# Patient Record
Sex: FEMALE | Race: WHITE
[De-identification: names, ages, dates, MRNs, and addresses within clinical notes are randomized per-mention and may not be internally consistent; named-entity substitution may affect disease eponyms.]

---

## 2020-05-01 ENCOUNTER — HOSPITAL ENCOUNTER (OUTPATIENT)
Dept: HOSPITAL 100 - ED | Age: 55
Setting detail: OBSERVATION
LOS: 1 days | Discharge: HOME | End: 2020-05-02
Payer: COMMERCIAL

## 2020-05-01 VITALS
DIASTOLIC BLOOD PRESSURE: 81 MMHG | HEART RATE: 78 BPM | RESPIRATION RATE: 18 BRPM | OXYGEN SATURATION: 94 % | TEMPERATURE: 97.88 F | SYSTOLIC BLOOD PRESSURE: 143 MMHG

## 2020-05-01 VITALS
RESPIRATION RATE: 18 BRPM | DIASTOLIC BLOOD PRESSURE: 84 MMHG | SYSTOLIC BLOOD PRESSURE: 128 MMHG | TEMPERATURE: 97.6 F | HEART RATE: 79 BPM | OXYGEN SATURATION: 99 %

## 2020-05-01 VITALS — RESPIRATION RATE: 14 BRPM | HEART RATE: 87 BPM | TEMPERATURE: 97.88 F | OXYGEN SATURATION: 99 %

## 2020-05-01 VITALS
DIASTOLIC BLOOD PRESSURE: 80 MMHG | RESPIRATION RATE: 18 BRPM | HEART RATE: 85 BPM | OXYGEN SATURATION: 98 % | SYSTOLIC BLOOD PRESSURE: 125 MMHG

## 2020-05-01 VITALS — HEART RATE: 79 BPM | DIASTOLIC BLOOD PRESSURE: 84 MMHG | SYSTOLIC BLOOD PRESSURE: 128 MMHG

## 2020-05-01 VITALS — HEART RATE: 85 BPM

## 2020-05-01 VITALS — BODY MASS INDEX: 22.4 KG/M2 | BODY MASS INDEX: 23.8 KG/M2

## 2020-05-01 VITALS
TEMPERATURE: 98.06 F | HEART RATE: 79 BPM | OXYGEN SATURATION: 99 % | RESPIRATION RATE: 17 BRPM | SYSTOLIC BLOOD PRESSURE: 126 MMHG | DIASTOLIC BLOOD PRESSURE: 81 MMHG

## 2020-05-01 VITALS — HEART RATE: 76 BPM

## 2020-05-01 DIAGNOSIS — R07.89: Primary | ICD-10-CM

## 2020-05-01 DIAGNOSIS — Z87.891: ICD-10-CM

## 2020-05-01 DIAGNOSIS — M06.9: ICD-10-CM

## 2020-05-01 DIAGNOSIS — G89.29: ICD-10-CM

## 2020-05-01 DIAGNOSIS — Z79.899: ICD-10-CM

## 2020-05-01 DIAGNOSIS — G43.909: ICD-10-CM

## 2020-05-01 DIAGNOSIS — K21.9: ICD-10-CM

## 2020-05-01 DIAGNOSIS — M81.0: ICD-10-CM

## 2020-05-01 LAB
ANION GAP: 4 (ref 5–15)
ANISOCYTOSIS BLD QL SMEAR: (no result)
ANISOCYTOSIS: (no result)
BUN SERPL-MCNC: 24 MG/DL (ref 7–18)
BUN/CREAT RATIO: 22.2 RATIO (ref 10–20)
CALCIUM SERPL-MCNC: 9.2 MG/DL (ref 8.5–10.1)
CARBON DIOXIDE: 28 MMOL/L (ref 21–32)
CHLORIDE: 107 MMOL/L (ref 98–107)
DEPRECATED RDW RBC: 46.3 FL (ref 35.1–43.9)
DIFFERENTIAL INDICATED: (no result)
ERYTHROCYTE [DISTWIDTH] IN BLOOD: 12.8 % (ref 11.6–14.6)
EST GLOM FILT RATE - AFR AMER: 67 ML/MIN (ref 60–?)
ESTIMATED CREATININE CLEARANCE: 48.11 ML/MIN
GLUCOSE: 90 MG/DL (ref 74–106)
HCT VFR BLD AUTO: 34.7 % (ref 37–47)
HEMOGLOBIN: 11.2 G/DL (ref 12–15)
HGB BLD-MCNC: 11.2 G/DL (ref 12–15)
IMMATURE GRANULOCYTES COUNT: 0.03 X10^3/UL (ref 0–0)
MACROCYTES BLD QL: (no result)
MACROCYTOSIS: (no result)
MAGNESIUM: 2.3 MG/DL (ref 1.6–2.6)
MCV RBC: 97.7 FL (ref 81–99)
MEAN CORP HGB CONC: 32.3 G/DL (ref 32–36)
MEAN PLATELET VOL.: 10.2 FL (ref 6.2–12)
NRBC FLAGGED BY ANALYZER: 0 % (ref 0–5)
PLATELET # BLD: 303 K/MM3 (ref 150–450)
PLATELET COUNT: 303 K/MM3 (ref 150–450)
POSITIVE MORPHOLOGY: YES
POTASSIUM: 3.8 MMOL/L (ref 3.5–5.1)
RBC # BLD AUTO: 3.55 M/MM3 (ref 4.2–5.4)
RBC DISTRIBUTION WIDTH CV: 12.8 % (ref 11.6–14.6)
RBC DISTRIBUTION WIDTH SD: 46.3 FL (ref 35.1–43.9)
RBC MORPH BLD: (no result) NORMAL
RED CELL MORPHOLOGY: (no result) NORMAL
SCAN SMEAR PER REVIEW CRITERIA: (no result)
WBC # BLD AUTO: 8.1 K/MM3 (ref 4.4–11)
WHITE BLOOD COUNT: 8.1 K/MM3 (ref 4.4–11)

## 2020-05-01 PROCEDURE — 78452 HT MUSCLE IMAGE SPECT MULT: CPT

## 2020-05-01 PROCEDURE — 99218: CPT

## 2020-05-01 PROCEDURE — 80048 BASIC METABOLIC PNL TOTAL CA: CPT

## 2020-05-01 PROCEDURE — G0378 HOSPITAL OBSERVATION PER HR: HCPCS

## 2020-05-01 PROCEDURE — 93005 ELECTROCARDIOGRAM TRACING: CPT

## 2020-05-01 PROCEDURE — 84484 ASSAY OF TROPONIN QUANT: CPT

## 2020-05-01 PROCEDURE — A4216 STERILE WATER/SALINE, 10 ML: HCPCS

## 2020-05-01 PROCEDURE — 80061 LIPID PANEL: CPT

## 2020-05-01 PROCEDURE — 83735 ASSAY OF MAGNESIUM: CPT

## 2020-05-01 PROCEDURE — 93017 CV STRESS TEST TRACING ONLY: CPT

## 2020-05-01 PROCEDURE — 71046 X-RAY EXAM CHEST 2 VIEWS: CPT

## 2020-05-01 PROCEDURE — 36415 COLL VENOUS BLD VENIPUNCTURE: CPT

## 2020-05-01 PROCEDURE — 85025 COMPLETE CBC W/AUTO DIFF WBC: CPT

## 2020-05-01 PROCEDURE — A9500 TC99M SESTAMIBI: HCPCS

## 2020-05-01 PROCEDURE — 99285 EMERGENCY DEPT VISIT HI MDM: CPT

## 2020-05-01 RX ADMIN — NITROGLYCERIN 0.4 MG: 0.4 TABLET, ORALLY DISINTEGRATING SUBLINGUAL at 21:47

## 2020-05-02 VITALS
TEMPERATURE: 98.1 F | OXYGEN SATURATION: 99 % | SYSTOLIC BLOOD PRESSURE: 102 MMHG | RESPIRATION RATE: 16 BRPM | DIASTOLIC BLOOD PRESSURE: 69 MMHG | HEART RATE: 83 BPM

## 2020-05-02 VITALS
HEART RATE: 81 BPM | RESPIRATION RATE: 18 BRPM | TEMPERATURE: 97.34 F | SYSTOLIC BLOOD PRESSURE: 114 MMHG | DIASTOLIC BLOOD PRESSURE: 69 MMHG | OXYGEN SATURATION: 97 %

## 2020-05-02 VITALS
DIASTOLIC BLOOD PRESSURE: 74 MMHG | TEMPERATURE: 98 F | SYSTOLIC BLOOD PRESSURE: 116 MMHG | RESPIRATION RATE: 18 BRPM | HEART RATE: 78 BPM | OXYGEN SATURATION: 99 %

## 2020-05-02 VITALS — HEART RATE: 83 BPM

## 2020-05-02 VITALS — HEART RATE: 84 BPM

## 2020-05-02 VITALS — HEART RATE: 81 BPM

## 2020-05-02 LAB
ANION GAP: 7 (ref 5–15)
BUN SERPL-MCNC: 19 MG/DL (ref 7–18)
BUN/CREAT RATIO: 21.6 RATIO (ref 10–20)
CALCIUM SERPL-MCNC: 8.6 MG/DL (ref 8.5–10.1)
CARBON DIOXIDE: 24 MMOL/L (ref 21–32)
CHLORIDE: 110 MMOL/L (ref 98–107)
CHOLEST SERPL-MCNC: 211 MG/DL
EST GLOM FILT RATE - AFR AMER: 86 ML/MIN (ref 60–?)
ESTIMATED CREATININE CLEARANCE: 56.46 ML/MIN
GLUCOSE: 99 MG/DL (ref 74–106)
POTASSIUM: 3.4 MMOL/L (ref 3.5–5.1)
TRIGLYCERIDES: 136 MG/DL
VLDLC SERPL-MCNC: 27 MG/DL (ref 5–40)

## 2020-05-02 RX ADMIN — ASPIRIN 81 MG: 81 TABLET, COATED ORAL at 08:42

## 2020-05-02 RX ADMIN — BUPROPION HYDROCHLORIDE 150 MG: 150 TABLET, EXTENDED RELEASE ORAL at 11:05

## 2023-04-17 DIAGNOSIS — M85.852 OTHER SPECIFIED DISORDERS OF BONE DENSITY AND STRUCTURE, LEFT THIGH: ICD-10-CM

## 2023-04-17 DIAGNOSIS — M85.851 OTHER SPECIFIED DISORDERS OF BONE DENSITY AND STRUCTURE, RIGHT THIGH: ICD-10-CM

## 2023-04-17 RX ORDER — RALOXIFENE HYDROCHLORIDE 60 MG/1
TABLET, FILM COATED ORAL
Qty: 90 TABLET | Refills: 1 | Status: SHIPPED | OUTPATIENT
Start: 2023-04-17

## 2023-06-11 DIAGNOSIS — G43.001 MIGRAINE WITHOUT AURA AND WITH STATUS MIGRAINOSUS, NOT INTRACTABLE: Primary | ICD-10-CM

## 2023-06-12 RX ORDER — SUMATRIPTAN SUCCINATE 100 MG/1
TABLET ORAL
Qty: 27 TABLET | Refills: 3 | Status: SHIPPED | OUTPATIENT
Start: 2023-06-12 | End: 2023-09-10

## 2024-06-21 ENCOUNTER — HOSPITAL ENCOUNTER (OUTPATIENT)
Dept: RADIOLOGY | Facility: CLINIC | Age: 59
Discharge: HOME | End: 2024-06-21
Payer: COMMERCIAL

## 2024-06-21 ENCOUNTER — LAB REQUISITION (OUTPATIENT)
Dept: LAB | Facility: HOSPITAL | Age: 59
End: 2024-06-21
Payer: COMMERCIAL

## 2024-06-21 DIAGNOSIS — R07.81 RIB PAIN ON LEFT SIDE: ICD-10-CM

## 2024-06-21 DIAGNOSIS — S69.92XA INJURY OF LEFT WRIST, INITIAL ENCOUNTER: ICD-10-CM

## 2024-06-21 DIAGNOSIS — N39.0 URINARY TRACT INFECTION, SITE NOT SPECIFIED: ICD-10-CM

## 2024-06-21 PROCEDURE — 73110 X-RAY EXAM OF WRIST: CPT | Mod: LT

## 2024-06-21 PROCEDURE — 87086 URINE CULTURE/COLONY COUNT: CPT

## 2024-06-21 PROCEDURE — 71101 X-RAY EXAM UNILAT RIBS/CHEST: CPT | Mod: LT

## 2024-06-23 LAB — BACTERIA UR CULT: ABNORMAL

## 2025-03-31 ENCOUNTER — OFFICE (OUTPATIENT)
Dept: URBAN - METROPOLITAN AREA CLINIC 26 | Facility: CLINIC | Age: 60
End: 2025-03-31

## 2025-03-31 VITALS
SYSTOLIC BLOOD PRESSURE: 135 MMHG | HEIGHT: 63 IN | DIASTOLIC BLOOD PRESSURE: 85 MMHG | WEIGHT: 143 LBS | HEART RATE: 78 BPM | TEMPERATURE: 98.2 F

## 2025-03-31 DIAGNOSIS — K59.00 CONSTIPATION, UNSPECIFIED: ICD-10-CM

## 2025-03-31 DIAGNOSIS — K21.9 GASTRO-ESOPHAGEAL REFLUX DISEASE WITHOUT ESOPHAGITIS: ICD-10-CM

## 2025-03-31 DIAGNOSIS — R07.9 CHEST PAIN, UNSPECIFIED: ICD-10-CM

## 2025-03-31 DIAGNOSIS — R07.0 PAIN IN THROAT: ICD-10-CM

## 2025-03-31 PROCEDURE — 99204 OFFICE O/P NEW MOD 45 MIN: CPT | Performed by: INTERNAL MEDICINE

## 2025-04-24 ENCOUNTER — AMBULATORY SURGICAL CENTER (OUTPATIENT)
Dept: URBAN - METROPOLITAN AREA SURGERY 12 | Facility: SURGERY | Age: 60
End: 2025-04-24
Payer: COMMERCIAL

## 2025-04-24 ENCOUNTER — OFFICE (OUTPATIENT)
Dept: URBAN - METROPOLITAN AREA PATHOLOGY 2 | Facility: PATHOLOGY | Age: 60
End: 2025-04-24
Payer: COMMERCIAL

## 2025-04-24 VITALS
RESPIRATION RATE: 15 BRPM | DIASTOLIC BLOOD PRESSURE: 67 MMHG | DIASTOLIC BLOOD PRESSURE: 72 MMHG | HEART RATE: 82 BPM | DIASTOLIC BLOOD PRESSURE: 78 MMHG | DIASTOLIC BLOOD PRESSURE: 77 MMHG | DIASTOLIC BLOOD PRESSURE: 72 MMHG | DIASTOLIC BLOOD PRESSURE: 74 MMHG | DIASTOLIC BLOOD PRESSURE: 72 MMHG | RESPIRATION RATE: 16 BRPM | RESPIRATION RATE: 14 BRPM | OXYGEN SATURATION: 99 % | DIASTOLIC BLOOD PRESSURE: 67 MMHG | RESPIRATION RATE: 14 BRPM | HEART RATE: 79 BPM | SYSTOLIC BLOOD PRESSURE: 105 MMHG | DIASTOLIC BLOOD PRESSURE: 66 MMHG | DIASTOLIC BLOOD PRESSURE: 67 MMHG | SYSTOLIC BLOOD PRESSURE: 111 MMHG | HEART RATE: 85 BPM | HEART RATE: 79 BPM | SYSTOLIC BLOOD PRESSURE: 127 MMHG | HEART RATE: 82 BPM | RESPIRATION RATE: 9 BRPM | SYSTOLIC BLOOD PRESSURE: 117 MMHG | HEART RATE: 93 BPM | HEART RATE: 80 BPM | RESPIRATION RATE: 9 BRPM | HEART RATE: 74 BPM | HEART RATE: 85 BPM | SYSTOLIC BLOOD PRESSURE: 111 MMHG | RESPIRATION RATE: 15 BRPM | DIASTOLIC BLOOD PRESSURE: 74 MMHG | SYSTOLIC BLOOD PRESSURE: 136 MMHG | DIASTOLIC BLOOD PRESSURE: 73 MMHG | HEART RATE: 74 BPM | HEART RATE: 74 BPM | WEIGHT: 138 LBS | DIASTOLIC BLOOD PRESSURE: 77 MMHG | DIASTOLIC BLOOD PRESSURE: 73 MMHG | HEIGHT: 63 IN | RESPIRATION RATE: 15 BRPM | DIASTOLIC BLOOD PRESSURE: 73 MMHG | OXYGEN SATURATION: 99 % | DIASTOLIC BLOOD PRESSURE: 74 MMHG | HEART RATE: 93 BPM | RESPIRATION RATE: 9 BRPM | RESPIRATION RATE: 8 BRPM | TEMPERATURE: 97.3 F | HEART RATE: 85 BPM | DIASTOLIC BLOOD PRESSURE: 66 MMHG | OXYGEN SATURATION: 100 % | SYSTOLIC BLOOD PRESSURE: 116 MMHG | WEIGHT: 138 LBS | SYSTOLIC BLOOD PRESSURE: 116 MMHG | WEIGHT: 138 LBS | OXYGEN SATURATION: 100 % | DIASTOLIC BLOOD PRESSURE: 78 MMHG | OXYGEN SATURATION: 99 % | SYSTOLIC BLOOD PRESSURE: 105 MMHG | SYSTOLIC BLOOD PRESSURE: 117 MMHG | HEART RATE: 79 BPM | SYSTOLIC BLOOD PRESSURE: 111 MMHG | HEART RATE: 82 BPM | SYSTOLIC BLOOD PRESSURE: 116 MMHG | HEIGHT: 63 IN | SYSTOLIC BLOOD PRESSURE: 136 MMHG | SYSTOLIC BLOOD PRESSURE: 117 MMHG | HEART RATE: 93 BPM | DIASTOLIC BLOOD PRESSURE: 78 MMHG | SYSTOLIC BLOOD PRESSURE: 105 MMHG | SYSTOLIC BLOOD PRESSURE: 127 MMHG | DIASTOLIC BLOOD PRESSURE: 77 MMHG | RESPIRATION RATE: 16 BRPM | TEMPERATURE: 97.3 F | RESPIRATION RATE: 8 BRPM | SYSTOLIC BLOOD PRESSURE: 127 MMHG | OXYGEN SATURATION: 98 % | OXYGEN SATURATION: 98 % | SYSTOLIC BLOOD PRESSURE: 136 MMHG | HEIGHT: 63 IN | RESPIRATION RATE: 8 BRPM | OXYGEN SATURATION: 100 % | HEART RATE: 80 BPM | RESPIRATION RATE: 14 BRPM | HEART RATE: 80 BPM | DIASTOLIC BLOOD PRESSURE: 66 MMHG | OXYGEN SATURATION: 98 % | TEMPERATURE: 97.3 F | RESPIRATION RATE: 16 BRPM

## 2025-04-24 DIAGNOSIS — K22.2 ESOPHAGEAL OBSTRUCTION: ICD-10-CM

## 2025-04-24 DIAGNOSIS — R07.9 CHEST PAIN, UNSPECIFIED: ICD-10-CM

## 2025-04-24 DIAGNOSIS — K31.89 OTHER DISEASES OF STOMACH AND DUODENUM: ICD-10-CM

## 2025-04-24 DIAGNOSIS — T18.2XXA FOREIGN BODY IN STOMACH, INITIAL ENCOUNTER: ICD-10-CM

## 2025-04-24 DIAGNOSIS — K21.9 GASTRO-ESOPHAGEAL REFLUX DISEASE WITHOUT ESOPHAGITIS: ICD-10-CM

## 2025-04-24 PROCEDURE — 88342 IMHCHEM/IMCYTCHM 1ST ANTB: CPT | Performed by: PATHOLOGY

## 2025-04-24 PROCEDURE — 43239 EGD BIOPSY SINGLE/MULTIPLE: CPT | Performed by: INTERNAL MEDICINE

## 2025-04-24 PROCEDURE — 43450 DILATE ESOPHAGUS 1/MULT PASS: CPT | Performed by: INTERNAL MEDICINE

## 2025-04-24 PROCEDURE — 88305 TISSUE EXAM BY PATHOLOGIST: CPT | Performed by: PATHOLOGY

## 2025-07-10 ENCOUNTER — OFFICE VISIT (OUTPATIENT)
Dept: URGENT CARE | Age: 60
End: 2025-07-10
Payer: COMMERCIAL

## 2025-07-10 VITALS
TEMPERATURE: 98 F | BODY MASS INDEX: 25.27 KG/M2 | DIASTOLIC BLOOD PRESSURE: 85 MMHG | HEIGHT: 63 IN | SYSTOLIC BLOOD PRESSURE: 124 MMHG | RESPIRATION RATE: 16 BRPM | HEART RATE: 73 BPM | OXYGEN SATURATION: 98 % | WEIGHT: 142.6 LBS

## 2025-07-10 DIAGNOSIS — L03.90 CELLULITIS, UNSPECIFIED CELLULITIS SITE: Primary | ICD-10-CM

## 2025-07-10 RX ORDER — BUPROPION HYDROCHLORIDE 150 MG/1
TABLET ORAL
COMMUNITY

## 2025-07-10 RX ORDER — LORAZEPAM 0.5 MG/1
TABLET ORAL
COMMUNITY

## 2025-07-10 RX ORDER — METOPROLOL SUCCINATE 50 MG/1
50 TABLET, EXTENDED RELEASE ORAL DAILY
COMMUNITY

## 2025-07-10 RX ORDER — LIDOCAINE 560 MG/1
PATCH PERCUTANEOUS; TOPICAL; TRANSDERMAL
COMMUNITY
Start: 2025-03-02

## 2025-07-10 RX ORDER — OMEPRAZOLE 20 MG/1
1 CAPSULE, DELAYED RELEASE ORAL DAILY
COMMUNITY
Start: 2014-12-05

## 2025-07-10 RX ORDER — GABAPENTIN 100 MG/1
1 CAPSULE ORAL 3 TIMES DAILY
COMMUNITY

## 2025-07-10 RX ORDER — BUPROPION HYDROCHLORIDE 300 MG/1
TABLET ORAL
COMMUNITY

## 2025-07-10 RX ORDER — CEPHALEXIN 500 MG/1
500 CAPSULE ORAL 4 TIMES DAILY
Qty: 28 CAPSULE | Refills: 0 | Status: SHIPPED | OUTPATIENT
Start: 2025-07-10 | End: 2025-07-17

## 2025-07-10 ASSESSMENT — PAIN SCALES - GENERAL: PAINLEVEL_OUTOF10: 4

## 2025-07-10 NOTE — PROGRESS NOTES
"Subjective   Patient ID: Brielle Jon is a 59 y.o. female. They present today with a chief complaint of No chief complaint on file..    History of Present Illness  59-year-old female presents with rash to her left arm.  Started 2 days ago when she was walking past maybe had an insect bite had an itchy lesion scratched it the next day was more red erythematous and bruised so came in for evaluation.  She is on aspirin so stopped, for her to be bruise but this was more itchy and more swollen than usual.  Otherwise feels well.  No fever chills nausea vomit diarrhea.  No new medications.               Past Medical History  Allergies as of 07/10/2025 - Reviewed 07/10/2025   Allergen Reaction Noted    Pregabalin Swelling 04/20/2011    Etodolac Swelling 07/10/2025    Rosuvastatin Unknown and Myalgia 07/10/2025    Hydrocodone Itching 10/07/2022       Prescriptions Prior to Admission[1]     Medical History[2]    Surgical History[3]     reports that she has quit smoking. Her smoking use included cigarettes. She has never used smokeless tobacco. She reports current alcohol use. She reports that she does not use drugs.    Review of Systems  Review of Systems                               Objective    Vitals:    07/10/25 1650   BP: 124/85   BP Location: Right arm   Patient Position: Sitting   BP Cuff Size: Small adult   Pulse: 73   Resp: 16   Temp: 36.7 °C (98 °F)   TempSrc: Oral   SpO2: 98%   Weight: 64.7 kg (142 lb 9.6 oz)   Height: 1.6 m (5' 3\")     No LMP recorded. Patient is postmenopausal.    Physical Exam    General- No apparent distress, well appearing  Skin-patient with ecchymotic skin bruises consistent with some of her previous ones on her forearms with there are 2 central lumps consistent with papules with some surrounding erythema and induration.    Procedures    Point of Care Test & Imaging Results from this visit  No results found for this visit on 07/10/25.   Imaging  No results found.    Cardiology, Vascular, " and Other Imaging  No other imaging results found for the past 2 days      Diagnostic study results (if any) were reviewed by Russell Temple MD.    Assessment/Plan   Allergies, medications, history, and pertinent labs/EKGs/Imaging reviewed by Russell Temple MD.     Medical Decision Making  Rash-suspect mild cellulitis on top of an ecchymoses.  Continue warm compresses, start Keflex, follow-up with PCP as needed    Orders and Diagnoses  There are no diagnoses linked to this encounter.    Medical Admin Record      Patient disposition: Home    Electronically signed by Russell Temple MD  4:58 PM           [1] (Not in a hospital admission)  [2]   Past Medical History:  Diagnosis Date    Acute candidiasis of vulva and vagina 02/26/2013    Vaginitis due to Candida albicans    Arthropathy, unspecified 02/26/2013    Arthropathy    Cellulitis of face 02/26/2013    Facial cellulitis    Chest pain, unspecified 02/26/2013    Chest pain    Diarrhea, unspecified 09/19/2016    Diarrhea in adult patient    Enthesopathy, unspecified 02/26/2013    Enthesopathy    Headache, unspecified 02/26/2013    Headache    Hemorrhage of anus and rectum 09/19/2016    Bright red rectal bleeding    Impacted cerumen, bilateral 09/04/2018    Bilateral impacted cerumen    Low back pain, unspecified 08/15/2017    Acute right-sided low back pain without sciatica    Meniere's disease, unspecified ear 04/10/2019    Vertigo, labyrinthine    Migraine with aura, not intractable, without status migrainosus 02/26/2013    Classic migraine with aura    Other abnormal and inconclusive findings on diagnostic imaging of breast 06/21/2013    Mammogram abnormal    Other chest pain 11/25/2019    Chest wall pain    Other chronic pain     Chronic pain    Other conditions influencing health status 02/26/2013    Familial Combined Hyperlipidemia    Other conditions influencing health status 01/10/2013    Sedimentation Rate Greater Than 100    Ovarian cyst 06/21/2013     Ovarian cyst    Pain in unspecified ankle and joints of unspecified foot     Ankle joint pain    Pain in unspecified hip     Joint pain, hip    Pain in unspecified knee     Joint pain, knee    Palpitations 02/26/2013    Palpitations    Paroxysmal tachycardia, unspecified (Multi) 02/26/2013    Paroxysmal tachycardia    Periapical abscess without sinus 02/26/2013    Periapical abscess    Personal history of diseases of the skin and subcutaneous tissue 11/01/2016    History of seborrhea    Personal history of diseases of the skin and subcutaneous tissue 08/29/2012    History of psoriasis    Personal history of other diseases of the digestive system 04/10/2019    History of gastroesophageal reflux (GERD)    Personal history of other diseases of the musculoskeletal system and connective tissue     History of low back pain    Personal history of other mental and behavioral disorders     History of depression    Personal history of other specified conditions 09/11/2018    History of abdominal pain    Personal history of other specified conditions 10/31/2017    History of abnormal mammogram    Personal history of other specified conditions 09/11/2018    History of epigastric pain    Personal history of other specified conditions     History of weakness    Personal history of other specified conditions     History of fatigue    Raynaud's syndrome without gangrene 02/26/2013    Raynaud's disease    Sprain of unspecified ligament of unspecified ankle, initial encounter 02/26/2013    Ankle sprain    Unspecified inflammatory spondylopathy, site unspecified 11/21/2012    Spondylitis    Unspecified lump in unspecified breast 06/21/2013    Lump or mass in breast    Unspecified osteoarthritis, unspecified site 02/26/2013    Osteoarthritis    Unspecified sprain of sternum, initial encounter 10/23/2017    Sprain of sternum, initial encounter    Zoster without complications 06/29/2019    Herpes zoster without complication   [3]   Past  Surgical History:  Procedure Laterality Date    HYSTERECTOMY  07/28/2021    Hysterectomy    OTHER SURGICAL HISTORY  08/14/2012    Tubal Ligation After Vaginal Delivery (Same Hospitalization)    OTHER SURGICAL HISTORY  07/28/2021    Shoulder surgery    OTHER SURGICAL HISTORY  07/28/2021    Foot surgery

## 2025-07-27 ENCOUNTER — OFFICE VISIT (OUTPATIENT)
Dept: URGENT CARE | Age: 60
End: 2025-07-27
Payer: COMMERCIAL

## 2025-07-27 VITALS
RESPIRATION RATE: 18 BRPM | SYSTOLIC BLOOD PRESSURE: 130 MMHG | WEIGHT: 138 LBS | DIASTOLIC BLOOD PRESSURE: 81 MMHG | BODY MASS INDEX: 24.45 KG/M2 | TEMPERATURE: 98 F | OXYGEN SATURATION: 97 % | HEIGHT: 63 IN | HEART RATE: 75 BPM

## 2025-07-27 DIAGNOSIS — N89.8 VAGINAL ITCHING: Primary | ICD-10-CM

## 2025-07-27 LAB
POC APPEARANCE, URINE: CLEAR
POC BILIRUBIN, URINE: NEGATIVE
POC BLOOD, URINE: NEGATIVE
POC COLOR, URINE: YELLOW
POC GLUCOSE, URINE: NEGATIVE MG/DL
POC KETONES, URINE: NEGATIVE MG/DL
POC LEUKOCYTES, URINE: NEGATIVE
POC NITRITE,URINE: NEGATIVE
POC PH, URINE: 6 PH
POC PROTEIN, URINE: NEGATIVE MG/DL
POC SPECIFIC GRAVITY, URINE: <=1.005
POC UROBILINOGEN, URINE: 0.2 EU/DL

## 2025-07-27 PROCEDURE — 99213 OFFICE O/P EST LOW 20 MIN: CPT | Performed by: PHYSICIAN ASSISTANT

## 2025-07-27 PROCEDURE — 81003 URINALYSIS AUTO W/O SCOPE: CPT | Performed by: PHYSICIAN ASSISTANT

## 2025-07-27 PROCEDURE — 99070 SPECIAL SUPPLIES PHYS/QHP: CPT | Performed by: PHYSICIAN ASSISTANT

## 2025-07-27 PROCEDURE — 3008F BODY MASS INDEX DOCD: CPT | Performed by: PHYSICIAN ASSISTANT

## 2025-07-27 RX ORDER — FLUCONAZOLE 150 MG/1
TABLET ORAL
Qty: 1 TABLET | Refills: 0 | Status: SHIPPED | OUTPATIENT
Start: 2025-07-27

## 2025-07-27 ASSESSMENT — PAIN SCALES - GENERAL: PAINLEVEL_OUTOF10: 4

## 2025-07-27 NOTE — PROGRESS NOTES
"Subjective   Patient ID: Brielle Jon is a 60 y.o. female. They present today with a chief complaint of vaginal itching    Patient disposition: Home    HISTORY OF PRESENT ILLNESS:    This is a 60 year old adult presenting for presumed vaginal yeast infection for 2 days. Admits itching of the vagina without dc. Denies urinary sx. Denies abdominal nor flank pain. Denies rash. Denies bleeding. Denies injury.         Past Medical History  Allergies as of 07/27/2025 - Reviewed 07/27/2025   Allergen Reaction Noted    Pregabalin Swelling 04/20/2011    Etodolac Swelling 07/10/2025    Rosuvastatin Unknown and Myalgia 07/10/2025    Hydrocodone Itching 10/07/2022       Prescriptions Prior to Admission[1]     Medical History[2]    Surgical History[3]     reports that she has quit smoking. Her smoking use included cigarettes. She has never used smokeless tobacco. She reports current alcohol use. She reports that she does not use drugs.    Review of Systems    Negative except as documented in the History of Present Illness.                             Objective    Vitals:    07/27/25 0946   BP: 130/81   BP Location: Left arm   Patient Position: Sitting   BP Cuff Size: Adult   Pulse: 75   Resp: 18   Temp: 36.7 °C (98 °F)   TempSrc: Oral   SpO2: 97%   Weight: 62.6 kg (138 lb)   Height: 1.6 m (5' 3\")     No LMP recorded. Patient has had a hysterectomy.      PHYSICAL EXAMINATION:    CONSTITUTIONAL: well-appearing, nontoxic    EYES:   No scleral icterus or orbital trauma noted. No conjunctival injection.     ENT:  Head and face are unremarkable and atraumatic. Mucous membranes moist.     LUNGS:  No respiratory distress noted. No coughing noted.    CARDIOVASCULAR:   Well-perfused.    ABDOMEN:  Nonobese, nondistended     MUSCULOSKELETAL: ALMAZAN with equal strength. Gait [observed to be normal.]    SKIN:   Good color, with no significant rashes, pallor, cyanosis, wounds.    NEURO:  Normal baseline mental status. No obvious neurological " deficits, normal sensation and strength bilaterally.    PSYCH: Appropriate mood and affect.         ---------------------------------------------------------------         MDM:  We discussed testing and treatment options. Examination and also lab testing for yeast infection was offered but we agreed to empiric tx for presumed yeast infection and she will fu PRN with her PCP or here PRN.            Procedures    Diagnostic study results (if any) were reviewed by Elijah Madison PA-C.    Results for orders placed or performed in visit on 07/27/25   POCT UA Automated manually resulted   Result Value Ref Range    POC Color, Urine Yellow Straw, Yellow, Light-Yellow    POC Appearance, Urine Clear Clear    POC Glucose, Urine NEGATIVE NEGATIVE mg/dl    POC Bilirubin, Urine NEGATIVE NEGATIVE    POC Ketones, Urine NEGATIVE NEGATIVE mg/dl    POC Specific Gravity, Urine <=1.005 1.005 - 1.035    POC Blood, Urine NEGATIVE NEGATIVE    POC PH, Urine 6.0 No Reference Range Established PH    POC Protein, Urine NEGATIVE NEGATIVE mg/dl    POC Urobilinogen, Urine 0.2 0.2, 1.0 EU/DL    Poc Nitrite, Urine NEGATIVE NEGATIVE    POC Leukocytes, Urine NEGATIVE NEGATIVE        Assessment/Plan   Allergies, medications, history, and pertinent labs/EKGs/Imaging reviewed by Elijah Madison PA-C.     Orders and Diagnoses  Diagnoses and all orders for this visit:  Urinary frequency  -     POCT UA Automated manually resulted      Medical Admin Record      Follow Up Instructions  No follow-ups on file.    Electronically signed by Elijah Madison PA-C  10:26 AM         [1] (Not in a hospital admission)  [2]   Past Medical History:  Diagnosis Date    Acute candidiasis of vulva and vagina 02/26/2013    Vaginitis due to Candida albicans    Arthropathy, unspecified 02/26/2013    Arthropathy    Cellulitis of face 02/26/2013    Facial cellulitis    Chest pain, unspecified 02/26/2013    Chest pain    Diarrhea, unspecified 09/19/2016    Diarrhea in adult  patient    Enthesopathy, unspecified 02/26/2013    Enthesopathy    Headache, unspecified 02/26/2013    Headache    Hemorrhage of anus and rectum 09/19/2016    Bright red rectal bleeding    Impacted cerumen, bilateral 09/04/2018    Bilateral impacted cerumen    Low back pain, unspecified 08/15/2017    Acute right-sided low back pain without sciatica    Meniere's disease, unspecified ear 04/10/2019    Vertigo, labyrinthine    Migraine with aura, not intractable, without status migrainosus 02/26/2013    Classic migraine with aura    Other abnormal and inconclusive findings on diagnostic imaging of breast 06/21/2013    Mammogram abnormal    Other chest pain 11/25/2019    Chest wall pain    Other chronic pain     Chronic pain    Other conditions influencing health status 02/26/2013    Familial Combined Hyperlipidemia    Other conditions influencing health status 01/10/2013    Sedimentation Rate Greater Than 100    Ovarian cyst 06/21/2013    Ovarian cyst    Pain in unspecified ankle and joints of unspecified foot     Ankle joint pain    Pain in unspecified hip     Joint pain, hip    Pain in unspecified knee     Joint pain, knee    Palpitations 02/26/2013    Palpitations    Paroxysmal tachycardia, unspecified (Multi) 02/26/2013    Paroxysmal tachycardia    Periapical abscess without sinus 02/26/2013    Periapical abscess    Personal history of diseases of the skin and subcutaneous tissue 11/01/2016    History of seborrhea    Personal history of diseases of the skin and subcutaneous tissue 08/29/2012    History of psoriasis    Personal history of other diseases of the digestive system 04/10/2019    History of gastroesophageal reflux (GERD)    Personal history of other diseases of the musculoskeletal system and connective tissue     History of low back pain    Personal history of other mental and behavioral disorders     History of depression    Personal history of other specified conditions 09/11/2018    History of  abdominal pain    Personal history of other specified conditions 10/31/2017    History of abnormal mammogram    Personal history of other specified conditions 09/11/2018    History of epigastric pain    Personal history of other specified conditions     History of weakness    Personal history of other specified conditions     History of fatigue    Raynaud's syndrome without gangrene 02/26/2013    Raynaud's disease    Sprain of unspecified ligament of unspecified ankle, initial encounter 02/26/2013    Ankle sprain    Unspecified inflammatory spondylopathy, site unspecified 11/21/2012    Spondylitis    Unspecified lump in unspecified breast 06/21/2013    Lump or mass in breast    Unspecified osteoarthritis, unspecified site 02/26/2013    Osteoarthritis    Unspecified sprain of sternum, initial encounter 10/23/2017    Sprain of sternum, initial encounter    Zoster without complications 06/29/2019    Herpes zoster without complication   [3]   Past Surgical History:  Procedure Laterality Date    HYSTERECTOMY  07/28/2021    Hysterectomy    OTHER SURGICAL HISTORY  08/14/2012    Tubal Ligation After Vaginal Delivery (Same Hospitalization)    OTHER SURGICAL HISTORY  07/28/2021    Shoulder surgery    OTHER SURGICAL HISTORY  07/28/2021    Foot surgery

## 2025-08-07 ENCOUNTER — OFFICE (OUTPATIENT)
Dept: URBAN - METROPOLITAN AREA CLINIC 27 | Facility: CLINIC | Age: 60
End: 2025-08-07
Payer: COMMERCIAL

## 2025-08-07 VITALS
WEIGHT: 142 LBS | DIASTOLIC BLOOD PRESSURE: 82 MMHG | HEART RATE: 80 BPM | SYSTOLIC BLOOD PRESSURE: 121 MMHG | TEMPERATURE: 98.2 F | HEIGHT: 63 IN

## 2025-08-07 DIAGNOSIS — K21.9 GASTRO-ESOPHAGEAL REFLUX DISEASE WITHOUT ESOPHAGITIS: ICD-10-CM

## 2025-08-07 DIAGNOSIS — R07.9 CHEST PAIN, UNSPECIFIED: ICD-10-CM

## 2025-08-07 DIAGNOSIS — Z12.11 ENCOUNTER FOR SCREENING FOR MALIGNANT NEOPLASM OF COLON: ICD-10-CM

## 2025-08-07 PROCEDURE — 99213 OFFICE O/P EST LOW 20 MIN: CPT | Performed by: INTERNAL MEDICINE
